# Patient Record
Sex: MALE | Race: WHITE | Employment: UNEMPLOYED | URBAN - METROPOLITAN AREA
[De-identification: names, ages, dates, MRNs, and addresses within clinical notes are randomized per-mention and may not be internally consistent; named-entity substitution may affect disease eponyms.]

---

## 2017-03-21 ENCOUNTER — HOSPITAL ENCOUNTER (OUTPATIENT)
Dept: SURGERY | Age: 27
Discharge: HOME | End: 2017-03-21

## 2017-03-21 NOTE — PERIOP NOTES
Call to pt at [phone] due to pt missed his PAT appointment. Spoke with pt. Pt stated he would call dr Sonja Huizar to reschedule. Number [phone] provided.

## 2017-03-21 NOTE — PERIOP NOTES
The following on chart - note from Dr John Singh dated 3-15-17 \"patient is cleared for surgery\", EKG 3-15-17 \"SR\"

## 2017-03-22 ENCOUNTER — HOSPITAL ENCOUNTER (OUTPATIENT)
Dept: SURGERY | Age: 27
Discharge: HOME | End: 2017-03-22

## 2017-10-22 ENCOUNTER — HOSPITAL ENCOUNTER (EMERGENCY)
Age: 27
Discharge: HOME | End: 2017-10-22
Attending: EMERGENCY MEDICINE
Payer: MEDICAID

## 2017-10-22 VITALS
SYSTOLIC BLOOD PRESSURE: 105 MMHG | TEMPERATURE: 98.9 F | HEIGHT: 71 IN | RESPIRATION RATE: 16 BRPM | WEIGHT: 170 LBS | OXYGEN SATURATION: 100 % | BODY MASS INDEX: 23.8 KG/M2 | HEART RATE: 116 BPM | DIASTOLIC BLOOD PRESSURE: 66 MMHG

## 2017-10-22 DIAGNOSIS — L05.01 PILONIDAL ABSCESS: Primary | ICD-10-CM

## 2017-10-22 LAB
ALBUMIN SERPL-MCNC: 3 G/DL (ref 3.5–5)
ALBUMIN/GLOB SERPL: 0.6 {RATIO} (ref 1.2–3.5)
ALP SERPL-CCNC: 391 U/L (ref 50–136)
ALT SERPL-CCNC: 41 U/L (ref 12–65)
ANION GAP SERPL CALC-SCNC: 8 MMOL/L (ref 7–16)
AST SERPL-CCNC: 40 U/L (ref 15–37)
BASOPHILS # BLD: 0 K/UL (ref 0–0.2)
BASOPHILS NFR BLD: 0 % (ref 0–2)
BILIRUB SERPL-MCNC: 0.3 MG/DL (ref 0.2–1.1)
BUN SERPL-MCNC: 5 MG/DL (ref 6–23)
CALCIUM SERPL-MCNC: 8.4 MG/DL (ref 8.3–10.4)
CHLORIDE SERPL-SCNC: 99 MMOL/L (ref 98–107)
CO2 SERPL-SCNC: 28 MMOL/L (ref 21–32)
CREAT SERPL-MCNC: 0.59 MG/DL (ref 0.8–1.5)
DIFFERENTIAL METHOD BLD: ABNORMAL
EOSINOPHIL # BLD: 0.1 K/UL (ref 0–0.8)
EOSINOPHIL NFR BLD: 1 % (ref 0.5–7.8)
ERYTHROCYTE [DISTWIDTH] IN BLOOD BY AUTOMATED COUNT: 15.1 % (ref 11.9–14.6)
GLOBULIN SER CALC-MCNC: 4.7 G/DL (ref 2.3–3.5)
GLUCOSE SERPL-MCNC: 94 MG/DL (ref 65–100)
HCT VFR BLD AUTO: 32.8 % (ref 41.1–50.3)
HGB BLD-MCNC: 10.6 G/DL (ref 13.6–17.2)
IMM GRANULOCYTES # BLD: 0 K/UL (ref 0–0.5)
IMM GRANULOCYTES NFR BLD: 0 % (ref 0–5)
LACTATE BLD-SCNC: 1 MMOL/L (ref 0.5–1.9)
LYMPHOCYTES # BLD: 2.3 K/UL (ref 0.5–4.6)
LYMPHOCYTES NFR BLD: 26 % (ref 13–44)
MCH RBC QN AUTO: 25.7 PG (ref 26.1–32.9)
MCHC RBC AUTO-ENTMCNC: 32.3 G/DL (ref 31.4–35)
MCV RBC AUTO: 79.6 FL (ref 79.6–97.8)
MONOCYTES # BLD: 0.7 K/UL (ref 0.1–1.3)
MONOCYTES NFR BLD: 8 % (ref 4–12)
NEUTS SEG # BLD: 5.6 K/UL (ref 1.7–8.2)
NEUTS SEG NFR BLD: 65 % (ref 43–78)
PLATELET # BLD AUTO: 427 K/UL (ref 150–450)
PMV BLD AUTO: 8.4 FL (ref 10.8–14.1)
POTASSIUM SERPL-SCNC: 4 MMOL/L (ref 3.5–5.1)
PROT SERPL-MCNC: 7.7 G/DL (ref 6.3–8.2)
RBC # BLD AUTO: 4.12 M/UL (ref 4.23–5.67)
SODIUM SERPL-SCNC: 135 MMOL/L (ref 136–145)
WBC # BLD AUTO: 8.7 K/UL (ref 4.3–11.1)

## 2017-10-22 PROCEDURE — 75810000289 HC I&D ABSCESS SIMP/COMP/MULT: Performed by: NURSE PRACTITIONER

## 2017-10-22 PROCEDURE — 85025 COMPLETE CBC W/AUTO DIFF WBC: CPT | Performed by: NURSE PRACTITIONER

## 2017-10-22 PROCEDURE — 96360 HYDRATION IV INFUSION INIT: CPT | Performed by: NURSE PRACTITIONER

## 2017-10-22 PROCEDURE — 74011250637 HC RX REV CODE- 250/637: Performed by: NURSE PRACTITIONER

## 2017-10-22 PROCEDURE — 99284 EMERGENCY DEPT VISIT MOD MDM: CPT | Performed by: NURSE PRACTITIONER

## 2017-10-22 PROCEDURE — 80053 COMPREHEN METABOLIC PANEL: CPT | Performed by: NURSE PRACTITIONER

## 2017-10-22 PROCEDURE — 83605 ASSAY OF LACTIC ACID: CPT

## 2017-10-22 PROCEDURE — 74011250636 HC RX REV CODE- 250/636: Performed by: NURSE PRACTITIONER

## 2017-10-22 RX ORDER — OXYCODONE HYDROCHLORIDE 5 MG/1
5 TABLET ORAL
Status: COMPLETED | OUTPATIENT
Start: 2017-10-22 | End: 2017-10-22

## 2017-10-22 RX ORDER — CLINDAMYCIN HYDROCHLORIDE 150 MG/1
300 CAPSULE ORAL 3 TIMES DAILY
Qty: 42 CAP | Refills: 0 | Status: SHIPPED | OUTPATIENT
Start: 2017-10-22 | End: 2017-10-29

## 2017-10-22 RX ADMIN — SODIUM CHLORIDE 1000 ML: 900 INJECTION, SOLUTION INTRAVENOUS at 17:22

## 2017-10-22 RX ADMIN — OXYCODONE HYDROCHLORIDE 5 MG: 5 TABLET ORAL at 17:55

## 2017-10-22 NOTE — ED NOTES
I have reviewed discharge instructions with the patient. The patient verbalized understanding. The patient is ambulatory upon exit and is in no acute distress. The patient has discharge instructions, follow up information, and prescription in hand. The patient is being driven home by mother. The patient and mother do not have any questions at this time.

## 2017-10-22 NOTE — ED NOTES
Patient refusing blood work and fluids at this time. Patient reports feeling \"much better\" and wants to leave. Provider notified.

## 2017-10-22 NOTE — ED PROVIDER NOTES
HPI Comments: Patient present with pilonidal abscess. He states area has been present for the past 3 days. He denies fever. He denies drainage from the area. He states history of the same in the past.     Patient is a 24 y.o. male presenting with abscess. The history is provided by the patient. Abscess    This is a new problem. The current episode started more than 2 days ago. The problem has been gradually worsening. The problem is associated with nothing. There has been no fever. The rash is present on the right buttock. The pain is at a severity of 8/10. The pain is moderate. The pain has been constant since onset. Associated symptoms include pain. He has tried nothing for the symptoms. Past Medical History:   Diagnosis Date    Anxiety 3/25/2013    Back problem 3/25/2013    Depressed 3/25/2013    Depressed 3/25/2013    Eczema 3/25/2013    Insomnia 3/25/2013    Kidney failure     Liver disease     \"high enzymes\"       Past Surgical History:   Procedure Laterality Date    HX TONSILLECTOMY           Family History:   Problem Relation Age of Onset    Diabetes Mother        Social History     Social History    Marital status: SINGLE     Spouse name: N/A    Number of children: N/A    Years of education: N/A     Occupational History     Not Employed     Social History Main Topics    Smoking status: Current Every Day Smoker     Packs/day: 1.00    Smokeless tobacco: Never Used    Alcohol use No    Drug use: No    Sexual activity: Not on file     Other Topics Concern    Not on file     Social History Narrative         ALLERGIES: Ibuprofen    Review of Systems   Constitutional: Negative for chills and fever. Respiratory: Negative for cough and shortness of breath. Cardiovascular: Negative for chest pain. Gastrointestinal: Negative for abdominal pain, constipation, diarrhea, nausea and vomiting. Musculoskeletal: Negative for arthralgias and myalgias. Skin: Positive for color change. abscess   Neurological: Negative for dizziness, syncope, weakness and headaches. Visit Vitals    /66    Pulse (!) 116    Temp 98.9 °F (37.2 °C)    Resp 16    Ht 5' 11\" (1.803 m)    Wt 77.1 kg (170 lb)    SpO2 100%    BMI 23.71 kg/m2              Physical Exam   Constitutional: He is oriented to person, place, and time. No distress. Cardiovascular: Regular rhythm. Tachycardia present. Abdominal: Soft. Bowel sounds are normal. He exhibits no distension. There is no tenderness. Neurological: He is alert and oriented to person, place, and time. Skin: Skin is warm, dry and intact. Rash noted. Rash is pustular. He is not diaphoretic. There is erythema. No pallor. Psychiatric: He has a normal mood and affect. His behavior is normal.   Nursing note and vitals reviewed. 4:46 PM-patient refusing blood work and fluids. He states he wants to go home. I went into to discuss options with patient. I explained that if he refuses blood work he will have to leave AMA. I also explained that he is ask risk for sepsis which can result in death if not taken care of appropriately. He states he would like to think about his decision. 4:57 PM-patient states he has decided to stay. He states he use to be an IV drug user and he was concerned the nurses were going to \"think bad\" of him when they saw his arms. Patient encouraged that we are here to take care of him and do what is going to make him feel better. He voiced understanding.    Recent Results (from the past 12 hour(s))   CBC WITH AUTOMATED DIFF    Collection Time: 10/22/17  5:20 PM   Result Value Ref Range    WBC 8.7 4.3 - 11.1 K/uL    RBC 4.12 (L) 4.23 - 5.67 M/uL    HGB 10.6 (L) 13.6 - 17.2 g/dL    HCT 32.8 (L) 41.1 - 50.3 %    MCV 79.6 79.6 - 97.8 FL    MCH 25.7 (L) 26.1 - 32.9 PG    MCHC 32.3 31.4 - 35.0 g/dL    RDW 15.1 (H) 11.9 - 14.6 %    PLATELET 802 951 - 613 K/uL    MPV 8.4 (L) 10.8 - 14.1 FL    DF AUTOMATED NEUTROPHILS 65 43 - 78 %    LYMPHOCYTES 26 13 - 44 %    MONOCYTES 8 4.0 - 12.0 %    EOSINOPHILS 1 0.5 - 7.8 %    BASOPHILS 0 0.0 - 2.0 %    IMMATURE GRANULOCYTES 0 0.0 - 5.0 %    ABS. NEUTROPHILS 5.6 1.7 - 8.2 K/UL    ABS. LYMPHOCYTES 2.3 0.5 - 4.6 K/UL    ABS. MONOCYTES 0.7 0.1 - 1.3 K/UL    ABS. EOSINOPHILS 0.1 0.0 - 0.8 K/UL    ABS. BASOPHILS 0.0 0.0 - 0.2 K/UL    ABS. IMM. GRANS. 0.0 0.0 - 0.5 K/UL   METABOLIC PANEL, COMPREHENSIVE    Collection Time: 10/22/17  5:20 PM   Result Value Ref Range    Sodium 135 (L) 136 - 145 mmol/L    Potassium 4.0 3.5 - 5.1 mmol/L    Chloride 99 98 - 107 mmol/L    CO2 28 21 - 32 mmol/L    Anion gap 8 7 - 16 mmol/L    Glucose 94 65 - 100 mg/dL    BUN 5 (L) 6 - 23 MG/DL    Creatinine 0.59 (L) 0.8 - 1.5 MG/DL    GFR est AA >60 >60 ml/min/1.73m2    GFR est non-AA >60 >60 ml/min/1.73m2    Calcium 8.4 8.3 - 10.4 MG/DL    Bilirubin, total 0.3 0.2 - 1.1 MG/DL    ALT (SGPT) 41 12 - 65 U/L    AST (SGOT) 40 (H) 15 - 37 U/L    Alk. phosphatase 391 (H) 50 - 136 U/L    Protein, total 7.7 6.3 - 8.2 g/dL    Albumin 3.0 (L) 3.5 - 5.0 g/dL    Globulin 4.7 (H) 2.3 - 3.5 g/dL    A-G Ratio 0.6 (L) 1.2 - 3.5     POC LACTIC ACID    Collection Time: 10/22/17  5:25 PM   Result Value Ref Range    Lactic Acid (POC) 1.0 0.5 - 1.9 mmol/L         MDM  Number of Diagnoses or Management Options  Pilonidal abscess: new and does not require workup  Diagnosis management comments: No acute findings noted on lab results. Patient given NS IV fluids and po oxycodone for pain. Patient given prescription for clindamycin. Patient referred to surgery. Patient noted to have elevated heart rate. Patient has history of the same at previous ED visits and visits with his pcp. No concerns for sepsis due to normal WBC, afebrile, and negative troponin.         Amount and/or Complexity of Data Reviewed  Clinical lab tests: ordered and reviewed  Tests in the medicine section of CPT®: ordered    Patient Progress  Patient progress: stable    ED Course       I&D Abcess Simple  Date/Time: 10/22/2017 6:21 PM  Performed by: Heloise Krabbe  Authorized by: Heloise Krabbe     Consent:     Consent obtained:  Verbal    Consent given by:  Patient    Risks discussed:  Incomplete drainage    Alternatives discussed:  No treatment  Location:     Type:  Abscess    Size:  3 cm    Location:  Anogenital    Anogenital location:  Pilonidal  Pre-procedure details:     Skin preparation:  Betadine  Anesthesia (see MAR for exact dosages): Anesthesia method:  Local infiltration    Local anesthetic:  Lidocaine 1% w/o epi  Procedure type:     Complexity:  Simple  Procedure details:     Needle aspiration: no      Incision types:  Stab incision    Incision depth:  Subcutaneous    Scalpel blade:  11    Drainage:  Serosanguinous    Drainage amount:  Copious    Wound treatment:  Wound left open    Packing materials:  None  Post-procedure details:     Patient tolerance of procedure:   Tolerated well, no immediate complications

## 2017-11-26 ENCOUNTER — HOSPITAL ENCOUNTER (EMERGENCY)
Age: 27
Discharge: HOME | End: 2017-11-26
Attending: EMERGENCY MEDICINE
Payer: MEDICAID

## 2017-11-26 VITALS
TEMPERATURE: 98.5 F | HEIGHT: 70 IN | SYSTOLIC BLOOD PRESSURE: 116 MMHG | RESPIRATION RATE: 19 BRPM | OXYGEN SATURATION: 100 % | DIASTOLIC BLOOD PRESSURE: 69 MMHG | HEART RATE: 115 BPM | WEIGHT: 170 LBS | BODY MASS INDEX: 24.34 KG/M2

## 2017-11-26 DIAGNOSIS — J02.9 SORE THROAT: Primary | ICD-10-CM

## 2017-11-26 DIAGNOSIS — M79.10 MYALGIA: ICD-10-CM

## 2017-11-26 LAB
ALBUMIN SERPL-MCNC: 3.1 G/DL (ref 3.5–5)
ALBUMIN/GLOB SERPL: 0.6 {RATIO} (ref 1.2–3.5)
ALP SERPL-CCNC: 582 U/L (ref 50–136)
ALT SERPL-CCNC: 42 U/L (ref 12–65)
ANION GAP SERPL CALC-SCNC: 9 MMOL/L (ref 7–16)
AST SERPL-CCNC: 36 U/L (ref 15–37)
ATRIAL RATE: 114 BPM
BASOPHILS # BLD: 0 K/UL (ref 0–0.2)
BASOPHILS NFR BLD: 0 % (ref 0–2)
BILIRUB SERPL-MCNC: 0.6 MG/DL (ref 0.2–1.1)
BUN SERPL-MCNC: 7 MG/DL (ref 6–23)
CALCIUM SERPL-MCNC: 9 MG/DL (ref 8.3–10.4)
CALCULATED P AXIS, ECG09: 68 DEGREES
CALCULATED R AXIS, ECG10: 68 DEGREES
CALCULATED T AXIS, ECG11: 46 DEGREES
CHLORIDE SERPL-SCNC: 94 MMOL/L (ref 98–107)
CK SERPL-CCNC: 161 U/L (ref 21–215)
CO2 SERPL-SCNC: 31 MMOL/L (ref 21–32)
CREAT SERPL-MCNC: 0.77 MG/DL (ref 0.8–1.5)
DEPRECATED S PYO AG THROAT QL EIA: NEGATIVE
DIAGNOSIS, 93000: NORMAL
DIFFERENTIAL METHOD BLD: ABNORMAL
EOSINOPHIL # BLD: 0 K/UL (ref 0–0.8)
EOSINOPHIL NFR BLD: 0 % (ref 0.5–7.8)
ERYTHROCYTE [DISTWIDTH] IN BLOOD BY AUTOMATED COUNT: 15.4 % (ref 11.9–14.6)
GLOBULIN SER CALC-MCNC: 5.5 G/DL (ref 2.3–3.5)
GLUCOSE SERPL-MCNC: 129 MG/DL (ref 65–100)
HCT VFR BLD AUTO: 33.8 % (ref 41.1–50.3)
HGB BLD-MCNC: 11.3 G/DL (ref 13.6–17.2)
IMM GRANULOCYTES # BLD: 0 K/UL (ref 0–0.5)
IMM GRANULOCYTES NFR BLD AUTO: 0 % (ref 0–5)
LACTATE BLD-SCNC: 2 MMOL/L (ref 0.5–1.9)
LYMPHOCYTES # BLD: 1.8 K/UL (ref 0.5–4.6)
LYMPHOCYTES NFR BLD: 26 % (ref 13–44)
MCH RBC QN AUTO: 26.2 PG (ref 26.1–32.9)
MCHC RBC AUTO-ENTMCNC: 33.4 G/DL (ref 31.4–35)
MCV RBC AUTO: 78.2 FL (ref 79.6–97.8)
MONOCYTES # BLD: 0.6 K/UL (ref 0.1–1.3)
MONOCYTES NFR BLD: 8 % (ref 4–12)
NEUTS SEG # BLD: 4.7 K/UL (ref 1.7–8.2)
NEUTS SEG NFR BLD: 66 % (ref 43–78)
P-R INTERVAL, ECG05: 130 MS
PLATELET # BLD AUTO: 402 K/UL (ref 150–450)
PMV BLD AUTO: 9.2 FL (ref 10.8–14.1)
POTASSIUM SERPL-SCNC: 3.3 MMOL/L (ref 3.5–5.1)
PROCALCITONIN SERPL-MCNC: 0.2 NG/ML
PROT SERPL-MCNC: 8.6 G/DL (ref 6.3–8.2)
Q-T INTERVAL, ECG07: 316 MS
QRS DURATION, ECG06: 78 MS
QTC CALCULATION (BEZET), ECG08: 435 MS
RBC # BLD AUTO: 4.32 M/UL (ref 4.23–5.67)
SODIUM SERPL-SCNC: 134 MMOL/L (ref 136–145)
T4 FREE SERPL-MCNC: 1.1 NG/DL (ref 0.78–1.46)
TSH SERPL DL<=0.005 MIU/L-ACNC: 1.52 UIU/ML (ref 0.36–3.74)
VENTRICULAR RATE, ECG03: 114 BPM
WBC # BLD AUTO: 7.1 K/UL (ref 4.3–11.1)

## 2017-11-26 PROCEDURE — 93005 ELECTROCARDIOGRAM TRACING: CPT | Performed by: EMERGENCY MEDICINE

## 2017-11-26 PROCEDURE — 80053 COMPREHEN METABOLIC PANEL: CPT | Performed by: EMERGENCY MEDICINE

## 2017-11-26 PROCEDURE — 99284 EMERGENCY DEPT VISIT MOD MDM: CPT | Performed by: EMERGENCY MEDICINE

## 2017-11-26 PROCEDURE — 84443 ASSAY THYROID STIM HORMONE: CPT | Performed by: EMERGENCY MEDICINE

## 2017-11-26 PROCEDURE — 82550 ASSAY OF CK (CPK): CPT | Performed by: EMERGENCY MEDICINE

## 2017-11-26 PROCEDURE — 84145 PROCALCITONIN (PCT): CPT | Performed by: EMERGENCY MEDICINE

## 2017-11-26 PROCEDURE — 84439 ASSAY OF FREE THYROXINE: CPT | Performed by: EMERGENCY MEDICINE

## 2017-11-26 PROCEDURE — 83605 ASSAY OF LACTIC ACID: CPT

## 2017-11-26 PROCEDURE — 85025 COMPLETE CBC W/AUTO DIFF WBC: CPT | Performed by: EMERGENCY MEDICINE

## 2017-11-26 PROCEDURE — 87081 CULTURE SCREEN ONLY: CPT | Performed by: EMERGENCY MEDICINE

## 2017-11-26 PROCEDURE — 87880 STREP A ASSAY W/OPTIC: CPT | Performed by: EMERGENCY MEDICINE

## 2017-11-26 NOTE — ED NOTES
I have reviewed discharge instructions with the patient. The patient verbalized understanding. Patient left ED via Discharge Method: ambulatory to Home with mother. Opportunity for questions and clarification provided. Patient given 0 scripts. To continue your aftercare when you leave the hospital, you may receive an automated call from our care team to check in on how you are doing. This is a free service and part of our promise to provide the best care and service to meet your aftercare needs.  If you have questions, or wish to unsubscribe from this service please call [phone]. Thank you for Choosing our [facility] Emergency Department.

## 2017-11-26 NOTE — ED PROVIDER NOTES
HPI:  44-year-old male history of stage II renal disease, prior tonsillectomy for multiple strep in the past is here with multiple complaints including generalized achiness in both legs for approximately one week, 3 days of sore throat pain with swallowing and. No fever. + upper abdominal pain without nausea vomiting, diarrhea. No back pain. No rash. Denies any drug use. Denies alcohol consumption. Just wanted to know \"if I have an infection in my blood\"    ROS  Constitutional: No fever, no chills  Skin: no rash  Eye: No vision changes  ENMT: + sore throat, no congestion  Respiratory: No shortness of breath, no cough  Cardiovascular: No chest pain, no palpitations  Gastrointestinal: No vomiting, no nausea, no diarrhea, no constipation, + abdominal pain  : No dysuria, no penile discharge. Not sexually active   MSK: No back pain, no muscle pain, no joint pain  Neuro: No headache, no change in mental status, no numbness, no tingling, no weakness  Psych: No anxiety, no depression  Endocrine: No hyperglycemia  All other review of systems positive per history of present illness and the above otherwise negative or noncontributory. Visit Vitals    /78 (BP 1 Location: Left arm, BP Patient Position: At rest)    Pulse (!) 128    Temp 98.3 °F (36.8 °C)    Resp 20    Ht 5' 10\" (1.778 m)    Wt 77.1 kg (170 lb)    SpO2 100%    BMI 24.39 kg/m2     Past Medical History:   Diagnosis Date    Anxiety 3/25/2013    Back problem 3/25/2013    Chronic kidney disease     stage II kidney    Depressed 3/25/2013    Depressed 3/25/2013    Eczema 3/25/2013    Insomnia 3/25/2013    Kidney failure     Liver disease     \"high enzymes\"     Past Surgical History:   Procedure Laterality Date    HX TONSILLECTOMY       Prior to Admission Medications   Prescriptions Last Dose Informant Patient Reported? Taking?    ALPRAZolam (XANAX) 2 mg tablet   No Yes   Si po QID prn - no more than 4 a day Facility-Administered Medications: None         Adult Exam   General: alert, no acute distress  Head: normocephalic, atraumatic  ENT: moist mucous membranes  Neck: supple, non-tender; full range of motion  Cardiovascular: tachycardic, normal peripheral perfusion, no edema  Respiratory: lungs are clear to auscultation; normal respirations; no wheezing, rales or rhonchi  Gastrointestinal: soft, non-tender; no rebound or guarding, no peritoneal signs, no distension  Back: non-tender, full range of motion  Musculoskeletal: normal range of motion, normal strength, no gross deformities  Neurological: alert and oriented x 4, no gross focal deficits; normal speech  Psychiatric: appeared slightly anxious and somewhat pervasive when answering questions     MDM: sized tachycardia he has no other acute findings on exam.  He is somewhat pervasive unanswered question. He did admit to prior use of injectable cocaine when he was younger but denies any recent activities. He has no chest pain, shortness of breath. No leg swelling. I have very low suspicion for PE, acute ACS. Low suspicion for pneumonia. EKG obtained and interpreted by me. Sinus tachycardia rate of 114. Normal axis. Normal interval.  No obvious ST elevation noted. No AVR depression noted. Does not appear consistent with STEMI. No T-wave changes. No ectopy or Brugada noted. Do not suspect pericarditis. Do not suspect acute ACS. Likely viral etiology versus some type of recent drug use that the patient is not admitting to. He does have what appeared to be recent puncture wounds on bilateral arms. He is afebrile here. We'll obtain lactic acid, basic lab work, strep swab. Well's PE criteria 1.5 - highly unlikely to be PE. Clinically low suspicion for PE.    0200 - back to speak with the patient. Normal WBC however would like to obtain thyroid level, CK level, give him IV fluid. Would also like to obtain urine to rule out any infectious etiology. At that time patient stated he wants to go home. He does not want to stay. Stated he feels better. He just wanted to note that his white blood cell were normal.  I informed him that the white blood cell is not the only story. It does not tell us everything we need to know. I would recommend him stay in to obtain additional workup. However he refused. He also does not want to give us a urine sample. State he feels better and would like to go home. At this time he is awake, alert, does not appear to be any acute distress. Capable of making his own medical decision. The patient will be discharged would return precautions for sore throat, myalgia. Dragon voice recognition software was used to create this note. Although the note has been reviewed and corrected where necessary, additional errors may have been overlooked and remain in the text.

## 2017-11-26 NOTE — ED NOTES
Patient to ED with multiple vague complaints. Patient reports pain to BLE over the past few days. Patient also reports a sore throat over the past few days.  Patient also reports a rece

## 2017-11-26 NOTE — DISCHARGE INSTRUCTIONS
Muscle Aches: Care Instructions  Your Care Instructions    Muscle aches have many possible causes. Some common ones are overuse, tension, and injuries such as a strained muscle. An infection such as the flu can cause muscle aches. Or the aches may be caused by some medicines, such as statins. Muscle aches may also be a symptom of a disease like lupus or fibromyalgia. Myalgia is the medical term for muscle aches. The doctor will do a physical exam and ask questions to try to find what is causing your pain. You may also have tests such as blood tests or imaging tests like X-rays. These can help find or rule out serious problems. The doctor has checked you carefully, but problems can develop later. If you notice any problems or new symptoms, get medical treatment right away. Follow-up care is a key part of your treatment and safety. Be sure to make and go to all appointments, and call your doctor if you are having problems. It's also a good idea to know your test results and keep a list of the medicines you take. How can you care for yourself at home? · Rest the area that hurts. You may need to stop or reduce the activity that causes your symptoms. Then you can return to it slowly. · Put ice or a cold pack on the area for 10 to 20 minutes at a time to ease pain. Put a thin cloth between the ice and your skin. · Take an over-the-counter pain medicine, such as acetaminophen (Tylenol), ibuprofen (Advil, Motrin), or naproxen (Aleve). Be safe with medicines. Read and follow all instructions on the label. When should you call for help? Call your doctor now or seek immediate medical care if:  ? · Your pain gets worse. ? · You have new symptoms, such as a fever, swelling, or a rash. ? Watch closely for changes in your health, and be sure to contact your doctor if:  ? · You do not get better as expected. Where can you learn more? Go to http://tosin-maximo.info/.   Enter G355 in the search box to learn more about \"Muscle Aches: Care Instructions. \"  Current as of: October 14, 2016  Content Version: 11.4  © 2562-1231 [employer], [employer]. Care instructions adapted under license by [employer] (which disclaims liability or warranty for this information). If you have questions about a medical condition or this instruction, always ask your healthcare professional. [address] any warranty or liability for your use of this information.

## 2017-11-26 NOTE — ED NOTES
Patient to ED with multiple vague complaints. Patient states \"I need to check for an infection. \"     Patient reports BLE pain over the past few days, worse when he moves. Patient reports sore throat over past few days, that has resolved. Patient also reports he had an abscess drained appx 1 month ago and that it was oozing green puss prior to healing. Patient reports some nausea over the past few days. Patient afebrile though tachycardic at time of triage with . Patient very evasive to questions at time of triage.

## 2017-12-04 LAB
BACTERIA SPEC CULT: NORMAL
SERVICE CMNT-IMP: NORMAL